# Patient Record
Sex: MALE | Race: ASIAN | URBAN - METROPOLITAN AREA
[De-identification: names, ages, dates, MRNs, and addresses within clinical notes are randomized per-mention and may not be internally consistent; named-entity substitution may affect disease eponyms.]

---

## 2017-03-21 ENCOUNTER — APPOINTMENT (OUTPATIENT)
Dept: OPTOMETRY | Facility: CLINIC | Age: 27
End: 2017-03-21
Payer: COMMERCIAL

## 2017-03-21 ENCOUNTER — OFFICE VISIT (OUTPATIENT)
Dept: OPTOMETRY | Facility: CLINIC | Age: 27
End: 2017-03-21
Payer: COMMERCIAL

## 2017-03-21 DIAGNOSIS — H52.13 MYOPIA OF BOTH EYES WITH ASTIGMATISM: Primary | ICD-10-CM

## 2017-03-21 DIAGNOSIS — H52.203 MYOPIA OF BOTH EYES WITH ASTIGMATISM: Primary | ICD-10-CM

## 2017-03-21 PROCEDURE — 92015 DETERMINE REFRACTIVE STATE: CPT | Performed by: OPTOMETRIST

## 2017-03-21 PROCEDURE — 92004 COMPRE OPH EXAM NEW PT 1/>: CPT | Performed by: OPTOMETRIST

## 2017-03-21 PROCEDURE — 92340 FIT SPECTACLES MONOFOCAL: CPT | Performed by: OPTOMETRIST

## 2017-03-21 ASSESSMENT — REFRACTION_MANIFEST
OD_AXIS: 090
OS_SPHERE: -4.75
OD_CYLINDER: +4.25
OD_SPHERE: -5.50
OS_AXIS: 075
OS_CYLINDER: +4.00

## 2017-03-21 ASSESSMENT — CUP TO DISC RATIO
OS_RATIO: 0.3
OD_RATIO: 0.3

## 2017-03-21 ASSESSMENT — SLIT LAMP EXAM - LIDS
COMMENTS: NORMAL
COMMENTS: NORMAL

## 2017-03-21 ASSESSMENT — REFRACTION_WEARINGRX
OS_SPHERE: -5.00
SPECS_TYPE: SVL
OD_CYLINDER: +4.50
OS_AXIS: 073
OS_CYLINDER: +4.00
OD_SPHERE: -5.25
OD_AXIS: 100

## 2017-03-21 ASSESSMENT — VISUAL ACUITY
OD_CC: 20/20
CORRECTION_TYPE: GLASSES
OD_CC+: -1
OS_CC: 20/20
OS_SC: 20/30
OS_SC+: -1
OS_CC: 20/20 -1
METHOD: SNELLEN - LINEAR
OD_SC+: -1
OD_SC: 20/60
OD_CC: 20/20
OS_SC: 20/60
OD_SC: 20/60

## 2017-03-21 ASSESSMENT — TONOMETRY
IOP_METHOD: APPLANATION
OD_IOP_MMHG: 14
OS_IOP_MMHG: 14

## 2017-03-21 ASSESSMENT — EXTERNAL EXAM - RIGHT EYE: OD_EXAM: NORMAL

## 2017-03-21 ASSESSMENT — CONF VISUAL FIELD
OD_NORMAL: 1
OS_NORMAL: 1

## 2017-03-21 ASSESSMENT — EXTERNAL EXAM - LEFT EYE: OS_EXAM: NORMAL

## 2017-03-21 NOTE — PATIENT INSTRUCTIONS
A final glasses prescription was given.  The prescription change was very mild, so getting new glasses is optional.  If you do get new glasses, allow time for adaptation.  The glasses may cause dizziness and affect depth perception for awhile.  Return to clinic 1 year for Comprehensive Vision Exam      [place] Prasad O.D  [address]  [place]  [address]    [phone]

## 2017-03-21 NOTE — MR AVS SNAPSHOT
"              After Visit Summary   3/21/2017    Damian Cox    MRN: [id number]           Patient Information     Date Of Birth          1990        Visit Information        Provider Department      3/21/2017 1:30 PM [place] Prasad OD [facility]        Today's Diagnoses     Myopia of both eyes with astigmatism    -  1      Care Instructions        A final glasses prescription was given.  The prescription change was very mild, so getting new glasses is optional.  If you do get new glasses, allow time for adaptation.  The glasses may cause dizziness and affect depth perception for awhile.  Return to clinic 1 year for Comprehensive Vision Exam      [place] Prasad O.D  [facility]  [address] [place] [address]. [facility] [place]  [address]    [phone]                Follow-ups after your visit        Follow-up notes from your care team     Return in about 1 year (around 3/21/2018) for Eye Exam.      Who to contact     If you have questions or need follow up information about today's clinic visit or your schedule please contact [facility] directly at [phone].  Normal or non-critical lab and imaging results will be communicated to you by [employer], letter or phone within 4 business days after the clinic has received the results. If you do not hear from us within 7 days, please contact the clinic through i[employer] or phone. If you have a critical or abnormal lab result, we will notify you by phone as soon as possible.  Submit refill requests through [employer] or call your pharmacy and they will forward the refill request to us. Please allow 3 business days for your refill to be completed.          Additional Information About Your Visit        MyChart Information     [employer] lets you send messages to your doctor, view your test results, renew your prescriptions, schedule appointments and more. To sign up, go to [email or web address] . Click on \"Log in\" on the left side of the " "screen, which will take you to the Welcome page. Then click on \"Sign up Now\" on the right side of the page.     You will be asked to enter the access code listed below, as well as some personal information. Please follow the directions to create your username and password.     Your access code is: [email or web address]  Expires: 2017  2:34 PM     Your access code will  in 90 days. If you need help or a new code, please call your [facility] clinic or [phone].        Care EveryWhere ID     This is your Care EveryWhere ID. This could be used by other organizations to access your [facility] medical records  [id number]         Blood Pressure from Last 3 Encounters:   16 132/84   11/13/15 132/84   08/05/15 122/80    Weight from Last 3 Encounters:   16 123.8 kg (273 lb)   11/13/15 122 kg (269 lb)   08/05/15 123.8 kg (273 lb)              We Performed the Following     EYE EXAM (SIMPLE-NONBILLABLE)     REFRACTION        Primary Care Provider Office Phone # Fax #    Sergei Mensah PA-C [phone]       [address] [address]        Thank you!     Thank you for choosing [facility]  for your care. Our goal is always to provide you with excellent care. Hearing back from our patients is one way we can continue to improve our services. Please take a few minutes to complete the written survey that you may receive in the mail after your visit with us. Thank you!             Your Updated Medication List - Protect others around you: Learn how to safely use, store and throw away your medicines at www.disposemymeds.org.          This list is accurate as of: 3/21/17  2:34 PM.  Always use your most recent med list.                   Brand Name Dispense Instructions for use    ciprofloxacin 500 MG tablet    CIPRO    20 tablet    Take 1 tablet (500 mg) by mouth 2 times daily       fluticasone 50 MCG/ACT spray    FLONASE    16 g    Spray 2 " sprays into both nostrils daily

## 2017-03-21 NOTE — PROGRESS NOTES
Chief Complaint   Patient presents with     COMPREHENSIVE EYE EXAM      Accompanied by self  Last Eye Exam: 1 year ago  Dilated Previously: Yes    What are you currently using to see?  glasses       Distance Vision Acuity: Noticed gradual change in both eyes    Near Vision Acuity: Satisfied with vision while reading  unaided    Eye Comfort: good  Do you use eye drops? : No  Occupation or Hobbies: marketing    Zoya Grant, [employer]          Medical, surgical and family histories reviewed and updated 3/21/2017.       OBJECTIVE: See Ophthalmology exam    ASSESSMENT:    ICD-10-CM    1. Myopia of both eyes with astigmatism H52.13 EYE EXAM (SIMPLE-NONBILLABLE)    H52.203 REFRACTION      PLAN:   A final glasses prescription was given.  The prescription change was very mild, so getting new glasses is optional.  If you do get new glasses, allow time for adaptation.  The glasses may cause dizziness and affect depth perception for awhile.  Return to clinic 1 year for Comprehensive Vision Exam      [place]aura Prasad O.D  [address]  [place]  [address]    [phone]

## 2017-12-27 ENCOUNTER — APPOINTMENT (OUTPATIENT)
Dept: OPTOMETRY | Facility: CLINIC | Age: 27
End: 2017-12-27
Payer: COMMERCIAL

## 2017-12-27 PROCEDURE — 92340 FIT SPECTACLES MONOFOCAL: CPT | Performed by: OPTOMETRIST

## 2019-03-08 ENCOUNTER — APPOINTMENT (OUTPATIENT)
Dept: OPTOMETRY | Facility: CLINIC | Age: 29
End: 2019-03-08
Payer: COMMERCIAL

## 2020-02-10 ENCOUNTER — HEALTH MAINTENANCE LETTER (OUTPATIENT)
Age: 30
End: 2020-02-10

## 2020-11-16 DIAGNOSIS — Z31.41 ENCOUNTER FOR SPERM COUNT FOR FERTILITY TESTING: Primary | ICD-10-CM

## 2020-12-08 DIAGNOSIS — Z31.41 ENCOUNTER FOR SPERM COUNT FOR FERTILITY TESTING: ICD-10-CM

## 2020-12-08 PROCEDURE — 89322 SEMEN ANAL STRICT CRITERIA: CPT

## 2020-12-10 LAB
ABNORMAL SPERM: 92 MORPHOLOGY
ABSTINENCE DAYS: 2 DAYS (ref 2–7)
AGGLUTINATION: NO YES/NO
ANALYSIS TEMP - CENTIGRADE: 23 CENTIGRADE
CELL FRAGMENTS: ABNORMAL %
COLLECTION METHOD: ABNORMAL
COLLECTION SITE: ABNORMAL
CONSENT TO RELEASE TO PARTNER: YES
HEAD DEFECT: 93
IMMATURE SPERM: ABNORMAL %
IMMOTILE: 16 %
LAB RECEIPT TIME: ABNORMAL
LIQUEFIED: YES YES/NO
MIDPIECE DEFECT: 33
NON-PROGRESSIVE MOTILITY: 5 %
NORMAL SPERM: 8 % NORMAL FORMS (ref 4–?)
PROGRESSIVE MOTILITY: 79 % (ref 32–?)
ROUND CELLS: 0.2 MILLION/ML (ref ?–2)
SPECIMEN CONCENTRATION: 28 MILLION/ML (ref 15–?)
SPECIMEN PH: 7.6 PH (ref 7.2–?)
SPECIMEN TYPE: ABNORMAL
SPECIMEN VOL UR: 1.2 ML (ref 1.5–?)
TAIL DEFECT: 5
TIME OF ANALYSIS: ABNORMAL
TOTAL NUMBER: 34 MILLION (ref 39–?)
TOTAL PROGRESSIVE MOTILE: 27 MILLION (ref 15.6–?)
VISCOUS: YES YES/NO
VITALITY: ABNORMAL % (ref 58–?)
WBC SPECIMEN: ABNORMAL %

## 2021-01-26 ENCOUNTER — MYC MEDICAL ADVICE (OUTPATIENT)
Dept: FAMILY MEDICINE | Facility: CLINIC | Age: 31
End: 2021-01-26

## 2021-04-04 ENCOUNTER — HEALTH MAINTENANCE LETTER (OUTPATIENT)
Age: 31
End: 2021-04-04

## 2021-09-18 ENCOUNTER — HEALTH MAINTENANCE LETTER (OUTPATIENT)
Age: 31
End: 2021-09-18

## 2022-04-30 ENCOUNTER — HEALTH MAINTENANCE LETTER (OUTPATIENT)
Age: 32
End: 2022-04-30

## 2022-11-20 ENCOUNTER — HEALTH MAINTENANCE LETTER (OUTPATIENT)
Age: 32
End: 2022-11-20

## 2023-06-01 ENCOUNTER — HEALTH MAINTENANCE LETTER (OUTPATIENT)
Age: 33
End: 2023-06-01